# Patient Record
Sex: FEMALE | Race: WHITE | Employment: FULL TIME | URBAN - METROPOLITAN AREA
[De-identification: names, ages, dates, MRNs, and addresses within clinical notes are randomized per-mention and may not be internally consistent; named-entity substitution may affect disease eponyms.]

---

## 2024-03-08 ENCOUNTER — APPOINTMENT (EMERGENCY)
Dept: ULTRASOUND IMAGING | Facility: HOSPITAL | Age: 24
End: 2024-03-08
Payer: COMMERCIAL

## 2024-03-08 ENCOUNTER — HOSPITAL ENCOUNTER (EMERGENCY)
Facility: HOSPITAL | Age: 24
Discharge: HOME | End: 2024-03-09
Attending: EMERGENCY MEDICINE
Payer: COMMERCIAL

## 2024-03-08 VITALS
OXYGEN SATURATION: 100 % | SYSTOLIC BLOOD PRESSURE: 119 MMHG | RESPIRATION RATE: 18 BRPM | HEART RATE: 65 BPM | TEMPERATURE: 98.4 F | DIASTOLIC BLOOD PRESSURE: 63 MMHG

## 2024-03-08 DIAGNOSIS — O02.1 MISSED ABORTION: Primary | ICD-10-CM

## 2024-03-08 LAB
ABO GROUP BLD: NORMAL
ALBUMIN SERPL BCP-MCNC: 4.3 G/DL (ref 3.5–5)
ALP SERPL-CCNC: 80 U/L (ref 34–104)
ALT SERPL W P-5'-P-CCNC: 9 U/L (ref 7–52)
ANION GAP SERPL CALCULATED.3IONS-SCNC: 8 MMOL/L
AST SERPL W P-5'-P-CCNC: 13 U/L (ref 13–39)
B-HCG SERPL-ACNC: 7765 MIU/ML (ref 0–5)
BACTERIA UR QL AUTO: ABNORMAL /HPF
BASOPHILS # BLD AUTO: 0.05 THOUSANDS/ÂΜL (ref 0–0.1)
BASOPHILS NFR BLD AUTO: 1 % (ref 0–1)
BILIRUB SERPL-MCNC: 0.3 MG/DL (ref 0.2–1)
BILIRUB UR QL STRIP: NEGATIVE
BUN SERPL-MCNC: 9 MG/DL (ref 5–25)
CALCIUM SERPL-MCNC: 10 MG/DL (ref 8.4–10.2)
CHLORIDE SERPL-SCNC: 103 MMOL/L (ref 96–108)
CLARITY UR: CLEAR
CO2 SERPL-SCNC: 26 MMOL/L (ref 21–32)
COLOR UR: ABNORMAL
CREAT SERPL-MCNC: 0.52 MG/DL (ref 0.6–1.3)
EOSINOPHIL # BLD AUTO: 0.23 THOUSAND/ÂΜL (ref 0–0.61)
EOSINOPHIL NFR BLD AUTO: 2 % (ref 0–6)
ERYTHROCYTE [DISTWIDTH] IN BLOOD BY AUTOMATED COUNT: 15 % (ref 11.6–15.1)
GFR SERPL CREATININE-BSD FRML MDRD: 133 ML/MIN/1.73SQ M
GLUCOSE SERPL-MCNC: 82 MG/DL (ref 65–140)
GLUCOSE UR STRIP-MCNC: NEGATIVE MG/DL
HCT VFR BLD AUTO: 35.4 % (ref 34.8–46.1)
HGB BLD-MCNC: 11.8 G/DL (ref 11.5–15.4)
HGB UR QL STRIP.AUTO: ABNORMAL
IMM GRANULOCYTES # BLD AUTO: 0.04 THOUSAND/UL (ref 0–0.2)
IMM GRANULOCYTES NFR BLD AUTO: 0 % (ref 0–2)
KETONES UR STRIP-MCNC: NEGATIVE MG/DL
LEUKOCYTE ESTERASE UR QL STRIP: NEGATIVE
LYMPHOCYTES # BLD AUTO: 3.52 THOUSANDS/ÂΜL (ref 0.6–4.47)
LYMPHOCYTES NFR BLD AUTO: 32 % (ref 14–44)
MCH RBC QN AUTO: 28.3 PG (ref 26.8–34.3)
MCHC RBC AUTO-ENTMCNC: 33.3 G/DL (ref 31.4–37.4)
MCV RBC AUTO: 85 FL (ref 82–98)
MONOCYTES # BLD AUTO: 0.7 THOUSAND/ÂΜL (ref 0.17–1.22)
MONOCYTES NFR BLD AUTO: 6 % (ref 4–12)
MUCOUS THREADS UR QL AUTO: ABNORMAL
NEUTROPHILS # BLD AUTO: 6.45 THOUSANDS/ÂΜL (ref 1.85–7.62)
NEUTS SEG NFR BLD AUTO: 59 % (ref 43–75)
NITRITE UR QL STRIP: NEGATIVE
NON-SQ EPI CELLS URNS QL MICRO: ABNORMAL /HPF
NRBC BLD AUTO-RTO: 0 /100 WBCS
PH UR STRIP.AUTO: 5.5 [PH]
PLATELET # BLD AUTO: 299 THOUSANDS/UL (ref 149–390)
PMV BLD AUTO: 9.3 FL (ref 8.9–12.7)
POTASSIUM SERPL-SCNC: 3.5 MMOL/L (ref 3.5–5.3)
PROT SERPL-MCNC: 7.6 G/DL (ref 6.4–8.4)
PROT UR STRIP-MCNC: NEGATIVE MG/DL
RBC # BLD AUTO: 4.17 MILLION/UL (ref 3.81–5.12)
RBC #/AREA URNS AUTO: ABNORMAL /HPF
RH BLD: POSITIVE
SODIUM SERPL-SCNC: 137 MMOL/L (ref 135–147)
SP GR UR STRIP.AUTO: 1.03 (ref 1–1.03)
UROBILINOGEN UR STRIP-ACNC: <2 MG/DL
WBC # BLD AUTO: 10.99 THOUSAND/UL (ref 4.31–10.16)
WBC #/AREA URNS AUTO: ABNORMAL /HPF

## 2024-03-08 PROCEDURE — 99284 EMERGENCY DEPT VISIT MOD MDM: CPT

## 2024-03-08 PROCEDURE — 86900 BLOOD TYPING SEROLOGIC ABO: CPT

## 2024-03-08 PROCEDURE — 86901 BLOOD TYPING SEROLOGIC RH(D): CPT

## 2024-03-08 PROCEDURE — 85025 COMPLETE CBC W/AUTO DIFF WBC: CPT

## 2024-03-08 PROCEDURE — 84702 CHORIONIC GONADOTROPIN TEST: CPT

## 2024-03-08 PROCEDURE — 81001 URINALYSIS AUTO W/SCOPE: CPT

## 2024-03-08 PROCEDURE — 99285 EMERGENCY DEPT VISIT HI MDM: CPT | Performed by: EMERGENCY MEDICINE

## 2024-03-08 PROCEDURE — 36415 COLL VENOUS BLD VENIPUNCTURE: CPT

## 2024-03-08 PROCEDURE — 76801 OB US < 14 WKS SINGLE FETUS: CPT

## 2024-03-08 PROCEDURE — 80053 COMPREHEN METABOLIC PANEL: CPT

## 2024-03-08 PROCEDURE — 99244 OFF/OP CNSLTJ NEW/EST MOD 40: CPT | Performed by: OBSTETRICS & GYNECOLOGY

## 2024-03-09 PROCEDURE — 96372 THER/PROPH/DIAG INJ SC/IM: CPT

## 2024-03-09 RX ORDER — MEDROXYPROGESTERONE ACETATE 150 MG/ML
150 INJECTION, SUSPENSION INTRAMUSCULAR ONCE
Status: DISCONTINUED | OUTPATIENT
Start: 2024-03-09 | End: 2024-03-09

## 2024-03-09 RX ORDER — MEDROXYPROGESTERONE ACETATE 150 MG/ML
150 INJECTION, SUSPENSION INTRAMUSCULAR ONCE
Status: COMPLETED | OUTPATIENT
Start: 2024-03-09 | End: 2024-03-09

## 2024-03-09 RX ORDER — MISOPROSTOL 200 UG/1
800 TABLET ORAL ONCE
Status: COMPLETED | OUTPATIENT
Start: 2024-03-09 | End: 2024-03-09

## 2024-03-09 RX ADMIN — MISOPROSTOL 800 MCG: 200 TABLET ORAL at 02:10

## 2024-03-09 RX ADMIN — MEDROXYPROGESTERONE ACETATE 150 MG: 150 INJECTION, SUSPENSION INTRAMUSCULAR at 02:11

## 2024-03-09 NOTE — ED PROVIDER NOTES
History  Chief Complaint   Patient presents with    Abdominal Pain     Patient c/o strong abdominal pain x3 days in lower abdomen. +nausea. Patient was had a failed pregnancy on  but began with this severe pain 3 days ago. Denies chills/fever/SOB.      23-year-old female with no significant past medical history, , with recent ultrasound performed on 24 at [facility] which revealed embryonic demise.  She was seen at her OB office during this time and elected for expectant management.  She comes to the emergency department today for evaluation of 3 days of intermittent sharp left lower quadrant abdominal pain along with associated yellow foul-smelling vaginal discharge.  Denies any vaginal bleeding.  She also reports associated dysuria but denies any hematuria or frequency/urgency.  Denies any fevers or chills.  Reports nausea but denies any chest pain, shortness of breath, vomiting, diarrhea.  LMP .        used: Yes ([id number])    Abdominal Pain  Associated symptoms: nausea and vaginal discharge    Associated symptoms: no chest pain, no chills, no cough, no diarrhea, no dysuria, no fever, no hematuria, no shortness of breath, no sore throat, no vaginal bleeding and no vomiting        None       History reviewed. No pertinent past medical history.    History reviewed. No pertinent surgical history.    History reviewed. No pertinent family history.  I have reviewed and agree with the history as documented.    E-Cigarette/Vaping     E-Cigarette/Vaping Substances     Social History     Tobacco Use    Smoking status: Never    Smokeless tobacco: Never   Substance Use Topics    Drug use: Never        Review of Systems   Constitutional:  Negative for chills and fever.   HENT:  Negative for ear pain and sore throat.    Eyes:  Negative for pain and visual disturbance.   Respiratory:  Negative for cough and shortness of breath.    Cardiovascular:  Negative for chest pain and palpitations.    Gastrointestinal:  Positive for abdominal pain and nausea. Negative for diarrhea and vomiting.   Genitourinary:  Positive for vaginal discharge. Negative for dysuria, hematuria and vaginal bleeding.   Musculoskeletal:  Negative for arthralgias and back pain.   Skin:  Negative for color change and rash.   Neurological:  Negative for seizures and syncope.   All other systems reviewed and are negative.      Physical Exam  ED Triage Vitals   Temperature Pulse Respirations Blood Pressure SpO2   03/08/24 1929 03/08/24 1931 03/08/24 1929 03/08/24 1929 03/08/24 1929   98.4 °F (36.9 °C) 81 18 135/91 97 %      Temp Source Heart Rate Source Patient Position - Orthostatic VS BP Location FiO2 (%)   03/08/24 1929 03/08/24 1929 03/08/24 1929 03/08/24 1929 --   Oral Monitor Sitting Left arm       Pain Score       03/08/24 1929       9             Orthostatic Vital Signs  Vitals:    03/08/24 1929 03/08/24 1931 03/08/24 2049 03/08/24 2258   BP: 135/91  117/62 119/63   Pulse:  81 69 65   Patient Position - Orthostatic VS: Sitting  Lying Lying       Physical Exam  Vitals and nursing note reviewed.   Constitutional:       General: She is not in acute distress.     Appearance: She is well-developed. She is not ill-appearing or toxic-appearing.   HENT:      Head: Normocephalic and atraumatic.   Eyes:      Conjunctiva/sclera: Conjunctivae normal.   Cardiovascular:      Rate and Rhythm: Normal rate and regular rhythm.      Heart sounds: No murmur heard.  Pulmonary:      Effort: Pulmonary effort is normal. No respiratory distress.      Breath sounds: Normal breath sounds.   Abdominal:      General: There is no distension.      Palpations: Abdomen is soft.      Tenderness: There is abdominal tenderness (mild, to deep palpation) in the suprapubic area and left lower quadrant. There is no guarding or rebound.   Musculoskeletal:         General: No swelling.      Cervical back: Neck supple.   Skin:     General: Skin is warm and dry.       Capillary Refill: Capillary refill takes less than 2 seconds.   Neurological:      General: No focal deficit present.      Mental Status: She is alert.         ED Medications  Medications   miSOPROStol (Cytotec) tablet 800 mcg (800 mcg Oral Given 3/9/24 0210)   medroxyPROGESTERone (DEPO-PROVERA) IM injection 150 mg (150 mg Intramuscular Given 3/9/24 0211)       Diagnostic Studies  Results Reviewed       Procedure Component Value Units Date/Time    hCG, quantitative [[id number]]  (Abnormal) Collected: 03/08/24 2042    Lab Status: Final result Specimen: Blood from Arm, Left Updated: 03/08/24 2140     HCG, Quant 7,765 mIU/mL     Narrative:       Expected Ranges:    HCG results between 5 and 25 mIU/mL may be indicative of early pregnancy but should be interpreted in light of the total clinical presentation.    HCG can rise to detectable levels in alfredo and post menopausal women (0-11.6 mIU/mL).     Approximate               Approximate HCG  Gestation age          Concentration ( mIU/mL)  _____________          ______________________   Weeks                      HCG values  0.2-1                       5-50  1-2                           2-3                         100-5000  3-4                         500-07531  4-5                         1000-84382  5-6                         33942-877805  6-8                         34474-539492  8-12                        [id number]      Comprehensive metabolic panel [[id number]]  (Abnormal) Collected: 03/08/24 2042    Lab Status: Final result Specimen: Blood from Arm, Left Updated: 03/08/24 2107     Sodium 137 mmol/L      Potassium 3.5 mmol/L      Chloride 103 mmol/L      CO2 26 mmol/L      ANION GAP 8 mmol/L      BUN 9 mg/dL      Creatinine 0.52 mg/dL      Glucose 82 mg/dL      Calcium 10.0 mg/dL      AST 13 U/L      ALT 9 U/L      Alkaline Phosphatase 80 U/L      Total Protein 7.6 g/dL      Albumin 4.3 g/dL      Total Bilirubin 0.30 mg/dL      eGFR 133 ml/min/1.73sq m      Narrative:      National Kidney Disease Foundation guidelines for Chronic Kidney Disease (CKD):     Stage 1 with normal or high GFR (GFR > 90 mL/min/1.73 square meters)    Stage 2 Mild CKD (GFR = 60-89 mL/min/1.73 square meters)    Stage 3A Moderate CKD (GFR = 45-59 mL/min/1.73 square meters)    Stage 3B Moderate CKD (GFR = 30-44 mL/min/1.73 square meters)    Stage 4 Severe CKD (GFR = 15-29 mL/min/1.73 square meters)    Stage 5 End Stage CKD (GFR <15 mL/min/1.73 square meters)  Note: GFR calculation is accurate only with a steady state creatinine    Urine Microscopic [[id number]]  (Abnormal) Collected: 03/08/24 2048    Lab Status: Final result Specimen: Urine, Clean Catch Updated: 03/08/24 2104     RBC, UA 1-2 /hpf      WBC, UA 1-2 /hpf      Epithelial Cells Occasional /hpf      Bacteria, UA None Seen /hpf      MUCUS THREADS Occasional    UA w Reflex to Microscopic w Reflex to Culture [[id number]]  (Abnormal) Collected: 03/08/24 2048    Lab Status: Final result Specimen: Urine, Clean Catch Updated: 03/08/24 2058     Color, UA Light Yellow     Clarity, UA Clear     Specific Gravity, UA 1.026     pH, UA 5.5     Leukocytes, UA Negative     Nitrite, UA Negative     Protein, UA Negative mg/dl      Glucose, UA Negative mg/dl      Ketones, UA Negative mg/dl      Urobilinogen, UA <2.0 mg/dl      Bilirubin, UA Negative     Occult Blood, UA Trace    CBC and differential [[id number]]  (Abnormal) Collected: 03/08/24 2042    Lab Status: Final result Specimen: Blood from Arm, Left Updated: 03/08/24 2053     WBC 10.99 Thousand/uL      RBC 4.17 Million/uL      Hemoglobin 11.8 g/dL      Hematocrit 35.4 %      MCV 85 fL      MCH 28.3 pg      MCHC 33.3 g/dL      RDW 15.0 %      MPV 9.3 fL      Platelets 299 Thousands/uL      nRBC 0 /100 WBCs      Neutrophils Relative 59 %      Immat GRANS % 0 %      Lymphocytes Relative 32 %      Monocytes Relative 6 %      Eosinophils Relative 2 %      Basophils Relative 1 %      Neutrophils Absolute  6.45 Thousands/µL      Immature Grans Absolute 0.04 Thousand/uL      Lymphocytes Absolute 3.52 Thousands/µL      Monocytes Absolute 0.70 Thousand/µL      Eosinophils Absolute 0.23 Thousand/µL      Basophils Absolute 0.05 Thousands/µL                    US OB < 14 weeks with transvaginal   Final Result by Mundo Gould DO ( [id number])      Twin gestation by history with 2 gestational sacs identified. A single fetal pole is identified in the larger gestational sac (gestational sac A), crown-rump length measured at approximately 5 mm. No discrete fetal heart tones identified on the current    exam to confirm fetal viability. Follow-up recommended.      Normal appearance of the bilateral ovaries.      Other findings as above.         Workstation performed: [id number]               Procedures  Procedures      ED Course  ED Course as of 24 1703   Fri Mar 08, 2024   2325 Patient with missed  and initially elected for expectant management but now wants to consider additional medical or surgical options.  OB consulted and will be down to evaluate.    Sat Mar 09, 2024   0138 OB discussed with patient medical versus surgical options for missed .  She initially decided on Mifepristone/Misoprostol. Unfortunately unable to prescribed Mifepristone given that it is a controlled substance.  Discussed in depth with patient her options using the [place] iPad .  Discussed that she could take the Cytotec in the ED and then she will start cramping/bleeding within the next few hours versus taking the Cytotec at home after she picks it up from the pharmacy versus scheduling an appointment with OB clinic since they have misoprostol there and then she can take the second dose 24 hours after.  Discussed with patient that the 2 medications given together is slightly more effective however the misoprostol is still an effective option alone.  Patient at this time has elected to take the misoprostol  while in the ED and she agrees to follow-up in the clinic within 1 to 2 weeks.  Stable for discharge.  Reviewed return precautions.                             SBIRT 20yo+      Flowsheet Row Most Recent Value   Initial Alcohol Screen: US AUDIT-C     1. How often do you have a drink containing alcohol? 0 Filed at: 2024   2. How many drinks containing alcohol do you have on a typical day you are drinking?  0 Filed at: 2024   3a. Male UNDER 65: How often do you have five or more drinks on one occasion? 0 Filed at: 2024   3b. FEMALE Any Age, or MALE 65+: How often do you have 4 or more drinks on one occassion? 0 Filed at: 2024   Audit-C Score 0 Filed at: 2024   FERNANDO: How many times in the past year have you...    Used an illegal drug or used a prescription medication for non-medical reasons? Never Filed at: 2024                  Medical Decision Making  23-year-old female with no significant past medical history, , coming in with yellow vaginal discharge and left lower quadrant abdominal pain for the past 3 days.  She had a recent ultrasound performed on 24 at [facility] which revealed embryonic demise and elected for expectant management at that time.  No vaginal bleeding.  Afebrile.  Patient is hemodynamically stable, well-appearing, do not suspect septic  at this time.  Speculum exam performed with OB at bedside, which revealed a scant amount of physiological discharge with closed cervix.  No cervical motion tenderness.  OB and myself discussed options with patient and she opted for Cytotec in ED and has agreed to follow-up in their clinic.  Stable for discharge.  Reviewed return precautions.     Amount and/or Complexity of Data Reviewed  Labs: ordered.  Radiology: ordered.          Disposition  Final diagnoses:   Missed      Time reflects when diagnosis was documented in both MDM as applicable and the Disposition within this note        Time User Action Codes Description Comment    3/8/2024 11:24 PM Esther Brown Add [O02.1] Missed            ED Disposition       ED Disposition   Discharge    Condition   Stable    Date/Time   Sat Mar 9, 2024 0103    Comment   Joy Rodriguez discharge to home/self care.                   Follow-up Information       Follow up With Specialties Details Why Contact Info Additional Information    [facility] Emergency Department Emergency Medicine  As needed, If symptoms worsen [address] [place] [id number]  [phone] [facility] Emergency Department, [facility] [place], [place], [address]    [facility] Follow up in 1 week(s) Please make a follow-up appointment in 1-2 weeks. [address]  [address]  [facility] [id number] [phone] [facility], [address], [facility] [address], [place], [place], [address] [phone]            There are no discharge medications for this patient.    No discharge procedures on file.    PDMP Review       None             ED Provider  Attending physically available and evaluated Joy Rodriguez. I managed the patient along with the ED Attending.    Electronically Signed by           Esther Brown MD  24 [phone]

## 2024-03-09 NOTE — CONSULTS
Consultation - Gynecology  Joy Rodriguez 23 y.o. female MRN: [id number]  Unit/Bed#: ED-18 Encounter: [id number]      Inpatient consult to Obstetrics / Gynecology  Consult performed by: Jade Yao MD  Consult ordered by: Emigdio Valdovinos MD        Missed   Assessment & Plan  Today we discussed findings consistent with missed  of approximately 6 weeks gestation   We discussed treatment options including :  1. Expectant management : Aware of risks and benefits of this including but not limited to timing, risk of bleeding and need for other interventions.  Reviewed that expectant management is successful in achieving complete expulsion of pregnancy in approximately 80% of women within 8 weeks.  2. Medical management : We discussed the use of mifepristone and misoprostol. Risks of this medication as well as proper use were reviewed. Aware of potential failure and need for repeat dosage   3. Surgical Management : We discussed D&E risks and benefits including but not limited to bleeding, infection, perforation, retained products needing further surgery, or blood transfusion       The patient desires to proceed with medication management. Mifepristone unavailable in hospital and cannot be sent to outpatient pharmacy. Offered appointment ASAP in clinic for mifepristone administration vs misoprostol now (or from outpatient pharmacy). Patient desires misoprostol now.    Patient also desires outpatient Nexplanon with Depo bridge; will order Depo now as she is not receiving mifepristone.    All questions answered. D/w Dr. Stewart.        Chief Complaint   Patient presents with    Abdominal Pain     Patient c/o strong abdominal pain x3 days in lower abdomen. +nausea. Patient was had a failed pregnancy on  but began with this severe pain 3 days ago. Denies chills/fever/SOB.        No new Assessment & Plan notes have been filed under this hospital service since the last note was  generated.  Service: OB/GYN        History of Present Illness   Physician Requesting Consult: Emigdio Valdovinos*  Reason for Consult / Principal Problem: Left lower quadrant pain with yellow vaginal discharge in the setting of missed AB  Subspeciality:  General OB/GYN  HPI: Joy Rodriguez is a 23 y.o. female who presents with 3 days of sharp intermittent left lower quadrant pain and vaginal discharge in the setting of missed AB.    Patient has been receiving early pregnancy care at [facility]. She received an ultrasound on 2/19/2024 that revealed a dichorionic diamniotic twin pregnancy.  Twin A with CRL 0.8 cm (6 weeks 4 days) without a heartbeat; twin B mean gestational sac 1.4 cm.  Patient elected for expectant management at that time.    Patient reports that, in the last 3 days, she has had intermittent left lower quadrant pain.  This has been sharp.  It is made worse and better when she lies in certain positions.  She has taken Tylenol intermittently for this pain; it has helped slightly.  She has not been taking ibuprofen with the thought that the ultrasound could have been incorrect and her pregnancy could still be viable.  Reviewed with the patient that, based on an unchanged ultrasound today, her pregnancy is not a healthy, viable pregnancy.    Regarding her vaginal discharge, patient reports that she has white vaginal discharge that is slightly musky in odor.  Upon review, patient reports that this is normal for her in pregnancy.  She is not concerned about it.      Review of Systems   Constitutional:  Negative for chills and fever.   HENT:  Negative for sore throat.    Respiratory:  Negative for cough and shortness of breath.    Cardiovascular:  Negative for chest pain and palpitations.   Gastrointestinal:  Positive for constipation. Negative for abdominal pain, blood in stool, diarrhea, nausea and vomiting.   Genitourinary:  Negative for dysuria and hematuria.   Skin:  Negative for rash.   Neurological:   Positive for headaches (Occasional headaches, slightly relieved by Tylenol). Negative for dizziness.       Historical Information   History reviewed. No pertinent past medical history.  History reviewed. No pertinent surgical history.  OB History   No obstetric history on file.     History reviewed. No pertinent family history.  Social History   Social History     Substance and Sexual Activity   Alcohol Use None     Social History     Substance and Sexual Activity   Drug Use Never     Social History     Tobacco Use   Smoking Status Never   Smokeless Tobacco Never       Meds/Allergies   No current facility-administered medications for this encounter.         Allergies   Allergen Reactions    Amoxicillin Hives       Objective   Vitals: Blood pressure 119/63, pulse 65, temperature 98.4 °F (36.9 °C), temperature source Oral, resp. rate 18, last menstrual period 12/23/2023, SpO2 100%. There is no height or weight on file to calculate BMI.    No intake or output data in the 24 hours ending 03/08/24 2342    Invasive Devices       Peripheral Intravenous Line  Duration             Peripheral IV 03/08/24 Left Antecubital <1 day                    Physical Exam  Constitutional:       Appearance: Normal appearance.   Eyes:      General: No scleral icterus.        Right eye: No discharge.         Left eye: No discharge.   Pulmonary:      Effort: Pulmonary effort is normal. No respiratory distress.   Abdominal:      General: There is no distension.      Palpations: Abdomen is soft. There is no mass.      Tenderness: There is no abdominal tenderness. There is no guarding.   Genitourinary:     Labia:         Right: No rash, tenderness or lesion.         Left: No rash, tenderness or lesion.       Vagina: No erythema or lesions.      Cervix: No cervical motion tenderness, lesion or erythema. Discharge: Scant white physiologic discharge.     Uterus: Not tender.       Adnexa:         Right: No mass or tenderness.          Left: No mass  or tenderness.     Musculoskeletal:         General: No swelling or tenderness.   Skin:     Findings: No rash.   Neurological:      Mental Status: She is alert.         Lab Results:   Recent Results (from the past 24 hour(s))   CBC and differential    Collection Time: 03/08/24  8:42 PM   Result Value Ref Range    WBC 10.99 (H) 4.31 - 10.16 Thousand/uL    RBC 4.17 3.81 - 5.12 Million/uL    Hemoglobin 11.8 11.5 - 15.4 g/dL    Hematocrit 35.4 34.8 - 46.1 %    MCV 85 82 - 98 fL    MCH 28.3 26.8 - 34.3 pg    MCHC 33.3 31.4 - 37.4 g/dL    RDW 15.0 11.6 - 15.1 %    MPV 9.3 8.9 - 12.7 fL    Platelets 299 149 - 390 Thousands/uL    nRBC 0 /100 WBCs    Neutrophils Relative 59 43 - 75 %    Immat GRANS % 0 0 - 2 %    Lymphocytes Relative 32 14 - 44 %    Monocytes Relative 6 4 - 12 %    Eosinophils Relative 2 0 - 6 %    Basophils Relative 1 0 - 1 %    Neutrophils Absolute 6.45 1.85 - 7.62 Thousands/µL    Immature Grans Absolute 0.04 0.00 - 0.20 Thousand/uL    Lymphocytes Absolute 3.52 0.60 - 4.47 Thousands/µL    Monocytes Absolute 0.70 0.17 - 1.22 Thousand/µL    Eosinophils Absolute 0.23 0.00 - 0.61 Thousand/µL    Basophils Absolute 0.05 0.00 - 0.10 Thousands/µL   Comprehensive metabolic panel    Collection Time: 03/08/24  8:42 PM   Result Value Ref Range    Sodium 137 135 - 147 mmol/L    Potassium 3.5 3.5 - 5.3 mmol/L    Chloride 103 96 - 108 mmol/L    CO2 26 21 - 32 mmol/L    ANION GAP 8 mmol/L    BUN 9 5 - 25 mg/dL    Creatinine 0.52 (L) 0.60 - 1.30 mg/dL    Glucose 82 65 - 140 mg/dL    Calcium 10.0 8.4 - 10.2 mg/dL    AST 13 13 - 39 U/L    ALT 9 7 - 52 U/L    Alkaline Phosphatase 80 34 - 104 U/L    Total Protein 7.6 6.4 - 8.4 g/dL    Albumin 4.3 3.5 - 5.0 g/dL    Total Bilirubin 0.30 0.20 - 1.00 mg/dL    eGFR 133 ml/min/1.73sq m   hCG, quantitative    Collection Time: 03/08/24  8:42 PM   Result Value Ref Range    HCG, Quant 7,765 (H) 0 - 5 mIU/mL   ABO/Rh    Collection Time: 03/08/24  8:42 PM   Result Value Ref Range    ABO  Grouping O     Rh Factor Positive    UA w Reflex to Microscopic w Reflex to Culture    Collection Time: 03/08/24  8:48 PM    Specimen: Urine, Clean Catch   Result Value Ref Range    Color, UA Light Yellow     Clarity, UA Clear     Specific Gravity, UA 1.026 1.003 - 1.030    pH, UA 5.5 4.5, 5.0, 5.5, 6.0, 6.5, 7.0, 7.5, 8.0    Leukocytes, UA Negative Negative    Nitrite, UA Negative Negative    Protein, UA Negative Negative mg/dl    Glucose, UA Negative Negative mg/dl    Ketones, UA Negative Negative mg/dl    Urobilinogen, UA <2.0 <2.0 mg/dl mg/dl    Bilirubin, UA Negative Negative    Occult Blood, UA Trace (A) Negative   Urine Microscopic    Collection Time: 03/08/24  8:48 PM   Result Value Ref Range    RBC, UA 1-2 None Seen, 1-2 /hpf    WBC, UA 1-2 None Seen, 1-2 /hpf    Epithelial Cells Occasional None Seen, Occasional /hpf    Bacteria, UA None Seen None Seen, Occasional /hpf    MUCUS THREADS Occasional (A) None Seen       Imaging:   TVUS 3/8/24  IMPRESSION:  Twin gestation by history with 2 gestational sacs identified. A single fetal pole is identified in the larger gestational sac (gestational sac A), crown-rump length measured at approximately 5 mm. No discrete fetal heart tones identified on the current   exam to confirm fetal viability. Follow-up recommended.     Normal appearance of the bilateral ovaries.    MEAN GESTATIONAL SAC SIZE: 28 mm       Jade Yao MD  3/8/2024  11:42 PM

## 2024-03-09 NOTE — DISCHARGE INSTRUCTIONS
-Schedule an appointment with [facility] Women's clinic at [address] within 1-2 weeks

## 2024-03-09 NOTE — ASSESSMENT & PLAN NOTE
Today we discussed findings consistent with missed  of approximately 6 weeks gestation   We discussed treatment options including :  1. Expectant management : Aware of risks and benefits of this including but not limited to timing, risk of bleeding and need for other interventions.  Reviewed that expectant management is successful in achieving complete expulsion of pregnancy in approximately 80% of women within 8 weeks.  2. Medical management : We discussed the use of mifepristone and misoprostol. Risks of this medication as well as proper use were reviewed. Aware of potential failure and need for repeat dosage   3. Surgical Management : We discussed D&E risks and benefits including but not limited to bleeding, infection, perforation, retained products needing further surgery, or blood transfusion       The patient desires to proceed with medication management. Mifepristone unavailable in hospital and cannot be sent to outpatient pharmacy. Offered appointment ASAP in clinic for mifepristone administration vs misoprostol now (or from outpatient pharmacy). Patient desires misoprostol now.    Patient also desires outpatient Nexplanon with Depo bridge; will order Depo now as she is not receiving mifepristone.    All questions answered. D/w Dr. Stewart.

## 2024-03-11 ENCOUNTER — TELEPHONE (OUTPATIENT)
Dept: OBGYN CLINIC | Facility: CLINIC | Age: 24
End: 2024-03-11

## 2024-03-11 NOTE — TELEPHONE ENCOUNTER
Lvm for patient to help schedule follow up appointment after being seen in hospital for a miscarriage . Given office call back number to contact when available.

## 2024-03-25 ENCOUNTER — OFFICE VISIT (OUTPATIENT)
Dept: OBGYN CLINIC | Facility: CLINIC | Age: 24
End: 2024-03-25

## 2024-03-25 VITALS
DIASTOLIC BLOOD PRESSURE: 84 MMHG | BODY MASS INDEX: 40.68 KG/M2 | HEIGHT: 63 IN | SYSTOLIC BLOOD PRESSURE: 119 MMHG | WEIGHT: 229.6 LBS | RESPIRATION RATE: 18 BRPM | HEART RATE: 93 BPM

## 2024-03-25 DIAGNOSIS — N93.9 EPISODE OF HEAVY VAGINAL BLEEDING: ICD-10-CM

## 2024-03-25 DIAGNOSIS — O02.1 MISSED ABORTION: Primary | ICD-10-CM

## 2024-03-25 PROCEDURE — 99213 OFFICE O/P EST LOW 20 MIN: CPT | Performed by: OBSTETRICS & GYNECOLOGY

## 2024-03-25 RX ORDER — ACETAMINOPHEN 325 MG/1
650 TABLET ORAL EVERY 6 HOURS PRN
COMMUNITY

## 2024-03-25 RX ORDER — FERROUS SULFATE 324(65)MG
324 TABLET, DELAYED RELEASE (ENTERIC COATED) ORAL
Qty: 60 TABLET | Refills: 0 | Status: SHIPPED | OUTPATIENT
Start: 2024-03-25

## 2024-03-25 NOTE — PROGRESS NOTES
"Missed Ab Follow-Up    SUBJECTIVE:  HPI: Joy Rodriguez is a 23 y.o.  female who presents to follow-up after miscarriage managed 3/8/24.    On 3/8, the patient had presented to the ED with LLQ pain and yellow vaginal discharge. She had been receiving care at [facility], where she was diagnosed with a missed . She had elected for expectant management initially, however chose medical management with misoprostol. She proceeded to have heavy bleeding for approximately 10 hours after discharge.  Since that time, her bleeding has gotten lighter, however it persisted until only a few days ago. She does endorse some lightheadedness/dizziness/fatigue since taking misoprostol, however no syncope.    She received depo while in the ED.    History reviewed. No pertinent past medical history.    Past Surgical History:   Procedure Laterality Date    HERNIA REPAIR      age 5       Social History     Tobacco Use    Smoking status: Never    Smokeless tobacco: Never   Substance Use Topics    Alcohol use: Never    Drug use: Never         Current Outpatient Medications:     acetaminophen (TYLENOL) 325 mg tablet, Take 650 mg by mouth every 6 (six) hours as needed for mild pain, Disp: , Rfl:       OBJECTIVE:  Vitals:    24 1516   BP: 119/84   BP Location: Left arm   Patient Position: Sitting   Cuff Size: Large   Pulse: 93   Resp: 18   Weight: 104 kg (229 lb 9.6 oz)   Height: 5' 2.5\" (1.588 m)       GEN: The patient was alert, pleasant well-appearing female in no acute distress.  HEENT: No subconjunctival pallor  CV: Regular rate  PULM: nonlabored respirations  MSK: Normal gait  Skin: warm, dry. Capillary refill <2 sec  Neuro: no focal deficits  Psych: normal affect and judgement, cooperative  : Normal appearing external genitalia, vaginal mucosa    ASSESSMENT/PLAN:  Problem List          Obstetrics/Gynecology    Missed      Other Visit Diagnoses       Episode of heavy vaginal bleeding                Joy Rodriguez is a 23 " y.o.  female who presents for follow-up of missed .    She is recovering well overall, however will collect CBC to ensure no anemia.  PO iron sent to pharmacy at this time; instructed patient to take qAM with OJ.    Patient still desires Nexplanon for contraception; forms filled out. Will call patient when available for placement.      Jade Yao MD   PGY-4, OBGYN  24 3:21 PM

## 2024-04-12 ENCOUNTER — TELEPHONE (OUTPATIENT)
Dept: OBGYN CLINIC | Facility: CLINIC | Age: 24
End: 2024-04-12

## 2024-04-12 NOTE — TELEPHONE ENCOUNTER
Lvm for patient to advise we have not received insurance card for processing of Nexplanon application. Given office call back number to contact.

## 2024-05-11 ENCOUNTER — HOSPITAL ENCOUNTER (EMERGENCY)
Facility: HOSPITAL | Age: 24
Discharge: HOME | End: 2024-05-11
Attending: EMERGENCY MEDICINE
Payer: COMMERCIAL

## 2024-05-11 ENCOUNTER — APPOINTMENT (EMERGENCY)
Dept: RADIOLOGY | Facility: HOSPITAL | Age: 24
End: 2024-05-11
Payer: COMMERCIAL

## 2024-05-11 VITALS
TEMPERATURE: 98.7 F | DIASTOLIC BLOOD PRESSURE: 64 MMHG | HEART RATE: 57 BPM | RESPIRATION RATE: 16 BRPM | OXYGEN SATURATION: 100 % | SYSTOLIC BLOOD PRESSURE: 119 MMHG

## 2024-05-11 DIAGNOSIS — R55 SYNCOPE: Primary | ICD-10-CM

## 2024-05-11 DIAGNOSIS — D64.9 ANEMIA: ICD-10-CM

## 2024-05-11 DIAGNOSIS — S80.819A LEG ABRASION: ICD-10-CM

## 2024-05-11 LAB
ABO GROUP BLD: NORMAL
ALBUMIN SERPL BCP-MCNC: 4.2 G/DL (ref 3.5–5)
ALP SERPL-CCNC: 99 U/L (ref 34–104)
ALT SERPL W P-5'-P-CCNC: 10 U/L (ref 7–52)
ANION GAP SERPL CALCULATED.3IONS-SCNC: 7 MMOL/L (ref 4–13)
AST SERPL W P-5'-P-CCNC: 14 U/L (ref 13–39)
BASOPHILS # BLD AUTO: 0.06 THOUSANDS/ÂΜL (ref 0–0.1)
BASOPHILS NFR BLD AUTO: 1 % (ref 0–1)
BILIRUB SERPL-MCNC: 0.31 MG/DL (ref 0.2–1)
BLD GP AB SCN SERPL QL: NEGATIVE
BUN SERPL-MCNC: 12 MG/DL (ref 5–25)
CALCIUM SERPL-MCNC: 8.8 MG/DL (ref 8.4–10.2)
CARDIAC TROPONIN I PNL SERPL HS: <2 NG/L
CHLORIDE SERPL-SCNC: 110 MMOL/L (ref 96–108)
CO2 SERPL-SCNC: 22 MMOL/L (ref 21–32)
CREAT SERPL-MCNC: 0.47 MG/DL (ref 0.6–1.3)
EOSINOPHIL # BLD AUTO: 0.25 THOUSAND/ÂΜL (ref 0–0.61)
EOSINOPHIL NFR BLD AUTO: 3 % (ref 0–6)
ERYTHROCYTE [DISTWIDTH] IN BLOOD BY AUTOMATED COUNT: 18.5 % (ref 11.6–15.1)
GFR SERPL CREATININE-BSD FRML MDRD: 137 ML/MIN/1.73SQ M
GLUCOSE SERPL-MCNC: 85 MG/DL (ref 65–140)
HCT VFR BLD AUTO: 29.9 % (ref 34.8–46.1)
HGB BLD-MCNC: 8.6 G/DL (ref 11.5–15.4)
IMM GRANULOCYTES # BLD AUTO: 0.03 THOUSAND/UL (ref 0–0.2)
IMM GRANULOCYTES NFR BLD AUTO: 0 % (ref 0–2)
LYMPHOCYTES # BLD AUTO: 2.55 THOUSANDS/ÂΜL (ref 0.6–4.47)
LYMPHOCYTES NFR BLD AUTO: 29 % (ref 14–44)
MCH RBC QN AUTO: 21.8 PG (ref 26.8–34.3)
MCHC RBC AUTO-ENTMCNC: 28.8 G/DL (ref 31.4–37.4)
MCV RBC AUTO: 76 FL (ref 82–98)
MONOCYTES # BLD AUTO: 0.54 THOUSAND/ÂΜL (ref 0.17–1.22)
MONOCYTES NFR BLD AUTO: 6 % (ref 4–12)
NEUTROPHILS # BLD AUTO: 5.43 THOUSANDS/ÂΜL (ref 1.85–7.62)
NEUTS SEG NFR BLD AUTO: 61 % (ref 43–75)
NRBC BLD AUTO-RTO: 0 /100 WBCS
PLATELET # BLD AUTO: 371 THOUSANDS/UL (ref 149–390)
PMV BLD AUTO: 9.8 FL (ref 8.9–12.7)
POTASSIUM SERPL-SCNC: 4.1 MMOL/L (ref 3.5–5.3)
PROT SERPL-MCNC: 7.4 G/DL (ref 6.4–8.4)
RBC # BLD AUTO: 3.95 MILLION/UL (ref 3.81–5.12)
RH BLD: POSITIVE
SODIUM SERPL-SCNC: 139 MMOL/L (ref 135–147)
SPECIMEN EXPIRATION DATE: NORMAL
WBC # BLD AUTO: 8.86 THOUSAND/UL (ref 4.31–10.16)

## 2024-05-11 PROCEDURE — 86901 BLOOD TYPING SEROLOGIC RH(D): CPT | Performed by: EMERGENCY MEDICINE

## 2024-05-11 PROCEDURE — 36415 COLL VENOUS BLD VENIPUNCTURE: CPT | Performed by: EMERGENCY MEDICINE

## 2024-05-11 PROCEDURE — 84484 ASSAY OF TROPONIN QUANT: CPT | Performed by: EMERGENCY MEDICINE

## 2024-05-11 PROCEDURE — 99285 EMERGENCY DEPT VISIT HI MDM: CPT | Performed by: EMERGENCY MEDICINE

## 2024-05-11 PROCEDURE — 73030 X-RAY EXAM OF SHOULDER: CPT

## 2024-05-11 PROCEDURE — 93005 ELECTROCARDIOGRAM TRACING: CPT

## 2024-05-11 PROCEDURE — 73590 X-RAY EXAM OF LOWER LEG: CPT

## 2024-05-11 PROCEDURE — 86850 RBC ANTIBODY SCREEN: CPT | Performed by: EMERGENCY MEDICINE

## 2024-05-11 PROCEDURE — 96374 THER/PROPH/DIAG INJ IV PUSH: CPT

## 2024-05-11 PROCEDURE — 80053 COMPREHEN METABOLIC PANEL: CPT | Performed by: EMERGENCY MEDICINE

## 2024-05-11 PROCEDURE — 85025 COMPLETE CBC W/AUTO DIFF WBC: CPT | Performed by: EMERGENCY MEDICINE

## 2024-05-11 PROCEDURE — 99284 EMERGENCY DEPT VISIT MOD MDM: CPT

## 2024-05-11 PROCEDURE — 86900 BLOOD TYPING SEROLOGIC ABO: CPT | Performed by: EMERGENCY MEDICINE

## 2024-05-11 RX ORDER — KETOROLAC TROMETHAMINE 30 MG/ML
15 INJECTION, SOLUTION INTRAMUSCULAR; INTRAVENOUS ONCE
Status: COMPLETED | OUTPATIENT
Start: 2024-05-11 | End: 2024-05-11

## 2024-05-11 RX ORDER — GINSENG 100 MG
1 CAPSULE ORAL ONCE
Status: COMPLETED | OUTPATIENT
Start: 2024-05-11 | End: 2024-05-11

## 2024-05-11 RX ADMIN — KETOROLAC TROMETHAMINE 15 MG: 30 INJECTION, SOLUTION INTRAMUSCULAR; INTRAVENOUS at 14:16

## 2024-05-11 RX ADMIN — BACITRACIN 1 SMALL APPLICATION: 500 OINTMENT TOPICAL at 14:16

## 2024-05-11 NOTE — ED PROVIDER NOTES
"History  Chief Complaint   Patient presents with    Fall     Pt arrives via EMS after near syncope with fall. Right shoulder pain. LLE pain. Ambulatory at home. -HS -thinners. Pt on her cycle now after having a spontaneous  last month and describes her bleeding as very heavy.      Joy is a 23 y.o. female who presents with the chief complaint of a syncopal episode.  She reports she was at home when she began to feel lightheaded.  The next thing she knew her child was saying \"mommy ... Mommy\".  She has pain in her right shoulder and has a superficial linear abrasion to her left shin. No similar symptoms in the past. No headache.  She reports she had a miscarriage 2 months ago, had no period last month and is currently on her period and that the bleeding is significant.       History provided by:  Patient   used: Yes (Tia ADAME)        Prior to Admission Medications   Prescriptions Last Dose Informant Patient Reported? Taking?   acetaminophen (TYLENOL) 325 mg tablet   Yes No   Sig: Take 650 mg by mouth every 6 (six) hours as needed for mild pain   ferrous sulfate 324 (65 Fe) mg   No No   Sig: Take 1 tablet (324 mg total) by mouth daily before breakfast      Facility-Administered Medications: None       No past medical history on file.    Past Surgical History:   Procedure Laterality Date    HERNIA REPAIR      age 5       Family History   Problem Relation Age of Onset    Heart attack Neg Hx     Stroke Neg Hx     Cancer Neg Hx      I have reviewed and agree with the history as documented.    E-Cigarette/Vaping    E-Cigarette Use Never User      E-Cigarette/Vaping Substances     Social History     Tobacco Use    Smoking status: Never    Smokeless tobacco: Never   Vaping Use    Vaping status: Never Used   Substance Use Topics    Alcohol use: Never    Drug use: Never       Review of Systems   Constitutional:  Negative for chills and fever.   Genitourinary:  Positive for menstrual problem and " vaginal bleeding.   Musculoskeletal:  Positive for arthralgias (right shoulder and left shin).   Skin:  Positive for wound (linear abrasion to left shin).   Neurological:  Positive for syncope.       Physical Exam  Physical Exam  Vitals and nursing note reviewed.   Constitutional:       General: She is in acute distress (mild).      Appearance: She is well-developed.   HENT:      Head: Normocephalic and atraumatic.   Eyes:      Pupils: Pupils are equal, round, and reactive to light.   Neck:      Vascular: No JVD.   Cardiovascular:      Rate and Rhythm: Normal rate and regular rhythm.      Heart sounds: Normal heart sounds. No murmur heard.     No friction rub. No gallop.   Pulmonary:      Effort: Pulmonary effort is normal. No respiratory distress.      Breath sounds: Normal breath sounds. No wheezing or rales.   Chest:      Chest wall: No tenderness.   Musculoskeletal:         General: Tenderness (right shoulder) present. No swelling or deformity. Normal range of motion.      Cervical back: Normal range of motion.   Skin:     General: Skin is warm and dry.      Comments: Long linear abrasion along anterior left shin     Neurological:      General: No focal deficit present.      Mental Status: She is alert and oriented to person, place, and time.   Psychiatric:         Behavior: Behavior normal.         Thought Content: Thought content normal.         Judgment: Judgment normal.         Vital Signs  ED Triage Vitals [05/11/24 1336]   Temperature Pulse Respirations Blood Pressure SpO2   98.7 °F (37.1 °C) 64 16 132/83 100 %      Temp Source Heart Rate Source Patient Position - Orthostatic VS BP Location FiO2 (%)   Oral Monitor Lying Left arm --      Pain Score       --           Vitals:    05/11/24 1336   BP: 132/83   Pulse: 64   Patient Position - Orthostatic VS: Lying         Visual Acuity      ED Medications  Medications   bacitracin topical ointment 1 small application (1 small application Topical Given 5/11/24  1416)   ketorolac (TORADOL) injection 15 mg (15 mg Intravenous Given 5/11/24 1416)       Diagnostic Studies  Results Reviewed       Procedure Component Value Units Date/Time    Comprehensive metabolic panel [[id number]]  (Abnormal) Collected: 05/11/24 1407    Lab Status: Final result Specimen: Blood from Arm, Right Updated: 05/11/24 1443     Sodium 139 mmol/L      Potassium 4.1 mmol/L      Chloride 110 mmol/L      CO2 22 mmol/L      ANION GAP 7 mmol/L      BUN 12 mg/dL      Creatinine 0.47 mg/dL      Glucose 85 mg/dL      Calcium 8.8 mg/dL      AST 14 U/L      ALT 10 U/L      Alkaline Phosphatase 99 U/L      Total Protein 7.4 g/dL      Albumin 4.2 g/dL      Total Bilirubin 0.31 mg/dL      eGFR 137 ml/min/1.73sq m     Narrative:      National Kidney Disease Foundation guidelines for Chronic Kidney Disease (CKD):     Stage 1 with normal or high GFR (GFR > 90 mL/min/1.73 square meters)    Stage 2 Mild CKD (GFR = 60-89 mL/min/1.73 square meters)    Stage 3A Moderate CKD (GFR = 45-59 mL/min/1.73 square meters)    Stage 3B Moderate CKD (GFR = 30-44 mL/min/1.73 square meters)    Stage 4 Severe CKD (GFR = 15-29 mL/min/1.73 square meters)    Stage 5 End Stage CKD (GFR <15 mL/min/1.73 square meters)  Note: GFR calculation is accurate only with a steady state creatinine    HS Troponin 0hr (reflex protocol) [[id number]]  (Normal) Collected: 05/11/24 1407    Lab Status: Final result Specimen: Blood from Arm, Right Updated: 05/11/24 1434     hs TnI 0hr <2 ng/L     HS Troponin I 2hr [[id number]]     Lab Status: No result Specimen: Blood     CBC and differential [[id number]]  (Abnormal) Collected: 05/11/24 1407    Lab Status: Final result Specimen: Blood from Arm, Right Updated: 05/11/24 1413     WBC 8.86 Thousand/uL      RBC 3.95 Million/uL      Hemoglobin 8.6 g/dL      Hematocrit 29.9 %      MCV 76 fL      MCH 21.8 pg      MCHC 28.8 g/dL      RDW 18.5 %      MPV 9.8 fL      Platelets 371 Thousands/uL      nRBC 0 /100 WBCs       Segmented % 61 %      Immature Grans % 0 %      Lymphocytes % 29 %      Monocytes % 6 %      Eosinophils Relative 3 %      Basophils Relative 1 %      Absolute Neutrophils 5.43 Thousands/µL      Absolute Immature Grans 0.03 Thousand/uL      Absolute Lymphocytes 2.55 Thousands/µL      Absolute Monocytes 0.54 Thousand/µL      Eosinophils Absolute 0.25 Thousand/µL      Basophils Absolute 0.06 Thousands/µL                    XR shoulder 2+ views RIGHT   ED Interpretation by Perico Ledbetter MD (05/11 1506)   This film was interpreted independently by me.  No fracture or dislocation.          XR tibia fibula 2 views LEFT   ED Interpretation by Perico Ledbetter MD (05/11 1506)   This film was interpreted independently by me.  No fracture or dislocation.                       Procedures  ECG 12 Lead Documentation Only    Date/Time: 5/11/2024 2:14 PM    Performed by: Perico Ledbetter MD  Authorized by: Perico Ledbetter MD    Indications / Diagnosis:  Syncope  ECG reviewed by me, the ED Provider: yes    Patient location:  ED  Previous ECG:     Previous ECG:  Unavailable  Interpretation:     Interpretation: normal    Rate:     ECG rate:  70    ECG rate assessment: normal    Rhythm:     Rhythm: sinus rhythm    Ectopy:     Ectopy: none    QRS:     QRS axis:  Normal    QRS intervals:  Normal  Conduction:     Conduction: normal    ST segments:     ST segments:  Normal  T waves:     T waves: normal             ED Course                       PERC Rule for PE      Flowsheet Row Most Recent Value   PERC Rule for PE    Age >=50 0 Filed at: 05/11/2024 1415   HR >=100 0 Filed at: 05/11/2024 1415   O2 Sat on room air < 95% 0 Filed at: 05/11/2024 1415   History of PE or DVT 0 Filed at: 05/11/2024 1415   Recent trauma or surgery 0 Filed at: 05/11/2024 1415   Hemoptysis 0 Filed at: 05/11/2024 1415   Exogenous estrogen 0 Filed at: 05/11/2024 1415   Unilateral leg swelling 0 Filed at: 05/11/2024 1415   PERC Rule for PE  Results 0 Filed at: 05/11/2024 1415                SBIRT 22yo+      Flowsheet Row Most Recent Value   Initial Alcohol Screen: US AUDIT-C     1. How often do you have a drink containing alcohol? 0 Filed at: 05/11/2024 1347   2. How many drinks containing alcohol do you have on a typical day you are drinking?  0 Filed at: 05/11/2024 1347   3b. FEMALE Any Age, or MALE 65+: How often do you have 4 or more drinks on one occassion? 0 Filed at: 05/11/2024 1347   Audit-C Score 0 Filed at: 05/11/2024 1347   FERNANDO: How many times in the past year have you...    Used an illegal drug or used a prescription medication for non-medical reasons? Never Filed at: 05/11/2024 1347            Wells' Criteria for PE      Flowsheet Row Most Recent Value   Wells' Criteria for PE    Clinical signs and symptoms of DVT 0 Filed at: 05/11/2024 1415   PE is primary diagnosis or equally likely 0 Filed at: 05/11/2024 1415   HR >100 0 Filed at: 05/11/2024 1415   Immobilization at least 3 days or Surgery in the previous 4 weeks 0 Filed at: 05/11/2024 1415   Previous, objectively diagnosed PE or DVT 0 Filed at: 05/11/2024 1415   Hemoptysis 0 Filed at: 05/11/2024 1415   Malignancy with treatment within 6 months or palliative 0 Filed at: 05/11/2024 1415   Wells' Criteria Total 0 Filed at: 05/11/2024 1415                  Medical Decision Making  Background: 23 y.o. female presents with syncope, pain in shoulder, left shin    Differential DX includes but is not limited to: anemia induced syncope, vasovagal syncope, arrhythmia, doubt acs, doubt PE    Plan: cbc, cmp, EKG, troponin, imaging, pain control      Amount and/or Complexity of Data Reviewed  Labs: ordered.  Radiology: ordered and independent interpretation performed.    Risk  OTC drugs.  Prescription drug management.             Disposition  Final diagnoses:   Syncope   Anemia   Leg abrasion     Time reflects when diagnosis was documented in both MDM as applicable and the Disposition within this  note       Time User Action Codes Description Comment    5/11/2024  3:23 PM Perico Ledbetter [R55] Syncope     5/11/2024  3:23 PM Perico Ledbetter Add [D64.9] Anemia     5/11/2024  3:24 PM Perico Ledbetter [S80.819A] Leg abrasion           ED Disposition       ED Disposition   Discharge    Condition   Stable    Date/Time   Sat May 11, 2024 1523    Comment   Joy Rodriguez discharge to home/self care.                   Follow-up Information       Follow up With Specialties Details Why Contact Info    Noreen Ramirez MD Internal Medicine Schedule an appointment as soon as possible for a visit in 1 week  [address] [facility]  [address]  [place] [id number] [phone]              Patient's Medications   Discharge Prescriptions    No medications on file       No discharge procedures on file.    PDMP Review       None            ED Provider  Electronically Signed by             Perico Ledbetter MD  05/11/24 1525

## 2024-05-11 NOTE — ED NOTES
Pt ambulated to br with steady gait, offers no complaints at this time. C/o dizziness after walking to bathroom and laying down. VSS reassessed and WDL     Laura Sommers RN  05/11/24 2441       Laura Sommers RN  05/11/24 9246

## 2024-05-12 LAB
ATRIAL RATE: 70 BPM
P AXIS: 36 DEGREES
PR INTERVAL: 166 MS
QRS AXIS: 30 DEGREES
QRSD INTERVAL: 84 MS
QT INTERVAL: 384 MS
QTC INTERVAL: 414 MS
T WAVE AXIS: 10 DEGREES
VENTRICULAR RATE: 70 BPM

## 2024-05-12 PROCEDURE — 93010 ELECTROCARDIOGRAM REPORT: CPT | Performed by: INTERNAL MEDICINE

## 2024-05-18 ENCOUNTER — HOSPITAL ENCOUNTER (EMERGENCY)
Facility: HOSPITAL | Age: 24
Discharge: HOME | End: 2024-05-18
Attending: EMERGENCY MEDICINE
Payer: COMMERCIAL

## 2024-05-18 VITALS
HEART RATE: 78 BPM | DIASTOLIC BLOOD PRESSURE: 71 MMHG | OXYGEN SATURATION: 100 % | RESPIRATION RATE: 18 BRPM | SYSTOLIC BLOOD PRESSURE: 123 MMHG | TEMPERATURE: 98 F

## 2024-05-18 DIAGNOSIS — N93.9 EPISODE OF HEAVY VAGINAL BLEEDING: ICD-10-CM

## 2024-05-18 DIAGNOSIS — N93.9 VAGINAL BLEEDING: Primary | ICD-10-CM

## 2024-05-18 LAB
ALBUMIN SERPL BCP-MCNC: 4.4 G/DL (ref 3.5–5)
ALP SERPL-CCNC: 91 U/L (ref 34–104)
ALT SERPL W P-5'-P-CCNC: 10 U/L (ref 7–52)
ANION GAP SERPL CALCULATED.3IONS-SCNC: 8 MMOL/L (ref 4–13)
APTT PPP: 33 SECONDS (ref 23–37)
AST SERPL W P-5'-P-CCNC: 15 U/L (ref 13–39)
BASOPHILS # BLD AUTO: 0.06 THOUSANDS/ÂΜL (ref 0–0.1)
BASOPHILS NFR BLD AUTO: 1 % (ref 0–1)
BILIRUB SERPL-MCNC: 0.3 MG/DL (ref 0.2–1)
BUN SERPL-MCNC: 14 MG/DL (ref 5–25)
CALCIUM SERPL-MCNC: 9.1 MG/DL (ref 8.4–10.2)
CHLORIDE SERPL-SCNC: 108 MMOL/L (ref 96–108)
CO2 SERPL-SCNC: 22 MMOL/L (ref 21–32)
CREAT SERPL-MCNC: 0.66 MG/DL (ref 0.6–1.3)
EOSINOPHIL # BLD AUTO: 0.35 THOUSAND/ÂΜL (ref 0–0.61)
EOSINOPHIL NFR BLD AUTO: 4 % (ref 0–6)
ERYTHROCYTE [DISTWIDTH] IN BLOOD BY AUTOMATED COUNT: 19.5 % (ref 11.6–15.1)
EXT PREGNANCY TEST URINE: NEGATIVE
EXT. CONTROL: NORMAL
GFR SERPL CREATININE-BSD FRML MDRD: 123 ML/MIN/1.73SQ M
GLUCOSE SERPL-MCNC: 87 MG/DL (ref 65–140)
HCT VFR BLD AUTO: 29.3 % (ref 34.8–46.1)
HGB BLD-MCNC: 8.6 G/DL (ref 11.5–15.4)
IMM GRANULOCYTES # BLD AUTO: 0.03 THOUSAND/UL (ref 0–0.2)
IMM GRANULOCYTES NFR BLD AUTO: 0 % (ref 0–2)
INR PPP: 1 (ref 0.84–1.19)
LYMPHOCYTES # BLD AUTO: 3.16 THOUSANDS/ÂΜL (ref 0.6–4.47)
LYMPHOCYTES NFR BLD AUTO: 36 % (ref 14–44)
MCH RBC QN AUTO: 21.9 PG (ref 26.8–34.3)
MCHC RBC AUTO-ENTMCNC: 29.4 G/DL (ref 31.4–37.4)
MCV RBC AUTO: 75 FL (ref 82–98)
MONOCYTES # BLD AUTO: 0.48 THOUSAND/ÂΜL (ref 0.17–1.22)
MONOCYTES NFR BLD AUTO: 6 % (ref 4–12)
NEUTROPHILS # BLD AUTO: 4.68 THOUSANDS/ÂΜL (ref 1.85–7.62)
NEUTS SEG NFR BLD AUTO: 53 % (ref 43–75)
NRBC BLD AUTO-RTO: 0 /100 WBCS
PLATELET # BLD AUTO: 427 THOUSANDS/UL (ref 149–390)
PMV BLD AUTO: 10.1 FL (ref 8.9–12.7)
POTASSIUM SERPL-SCNC: 3.7 MMOL/L (ref 3.5–5.3)
PROT SERPL-MCNC: 7.9 G/DL (ref 6.4–8.4)
PROTHROMBIN TIME: 13.8 SECONDS (ref 11.6–14.5)
RBC # BLD AUTO: 3.93 MILLION/UL (ref 3.81–5.12)
SODIUM SERPL-SCNC: 138 MMOL/L (ref 135–147)
WBC # BLD AUTO: 8.76 THOUSAND/UL (ref 4.31–10.16)

## 2024-05-18 PROCEDURE — 81025 URINE PREGNANCY TEST: CPT

## 2024-05-18 PROCEDURE — 85730 THROMBOPLASTIN TIME PARTIAL: CPT

## 2024-05-18 PROCEDURE — 36415 COLL VENOUS BLD VENIPUNCTURE: CPT

## 2024-05-18 PROCEDURE — 96361 HYDRATE IV INFUSION ADD-ON: CPT

## 2024-05-18 PROCEDURE — 83550 IRON BINDING TEST: CPT

## 2024-05-18 PROCEDURE — 83540 ASSAY OF IRON: CPT

## 2024-05-18 PROCEDURE — 80053 COMPREHEN METABOLIC PANEL: CPT

## 2024-05-18 PROCEDURE — 85025 COMPLETE CBC W/AUTO DIFF WBC: CPT

## 2024-05-18 PROCEDURE — 99283 EMERGENCY DEPT VISIT LOW MDM: CPT

## 2024-05-18 PROCEDURE — 96360 HYDRATION IV INFUSION INIT: CPT

## 2024-05-18 PROCEDURE — 85610 PROTHROMBIN TIME: CPT

## 2024-05-18 PROCEDURE — 99284 EMERGENCY DEPT VISIT MOD MDM: CPT | Performed by: EMERGENCY MEDICINE

## 2024-05-18 PROCEDURE — 82728 ASSAY OF FERRITIN: CPT

## 2024-05-18 RX ORDER — FERROUS SULFATE 324(65)MG
324 TABLET, DELAYED RELEASE (ENTERIC COATED) ORAL
Qty: 30 TABLET | Refills: 0 | Status: SHIPPED | OUTPATIENT
Start: 2024-05-18 | End: 2024-06-17

## 2024-05-18 RX ADMIN — SODIUM CHLORIDE 1000 ML: 0.9 INJECTION, SOLUTION INTRAVENOUS at 17:01

## 2024-05-18 NOTE — ED ATTENDING ATTESTATION
"5/18/2024  I, Anabell Jade MD, saw and evaluated the patient. I have discussed the patient with the resident/non-physician practitioner and agree with the resident's/non-physician practitioner's findings, Plan of Care, and MDM as documented in the resident's/non-physician practitioner's note, except where noted. All available labs and Radiology studies were reviewed.  I was present for key portions of any procedure(s) performed by the resident/non-physician practitioner and I was immediately available to provide assistance.       At this point I agree with the current assessment done in the Emergency Department.  I have conducted an independent evaluation of this patient a history and physical is as follows:    Patient is a 23-year-old female presents to the emergency department with ongoing vaginal bleeding and over the last several days lightheadedness, shortness of breath as well as lower abdominal discomfort/cramping.    In early March she was identified to have missed AB of a twin pregnancy.  She received misoprostol, followed up in the OB office and received Depo-Provera.  She had 10 days of heavy bleeding, lightening and then cessation of flow until May 6 at which time bleeding began again heavy.  She relates that throughout this time she has been needing to change her pad approximately every 2 hours.  On 4 occasions she has passed sizable clot which she describes as \"hard.\"  She has increased abdominal cramping around that time.  She shows a photo of 1 of these passages (it is dark and heterogeneous/nodular in appearance).  No problems with bowel movements or urination.    She was seen in the ED on 5/11 following episode of syncope.  Hemoglobin at that time was found to be 8.6.    On exam today she appears slightly tired.  Mucous membranes are moist.  She has mild pallor.  Heart sounds regular.  Lungs clear to auscultation bilaterally.  Tenderness present throughout the lower abdomen " without guarding.  No CVA tenderness.    Differential diagnosis includes but is not limited to incomplete AB/retained products, dysfunctional uterine bleeding, worsening of anemia, pelvic infection, new pregnancy +/- ectopic.    ED Course         Critical Care Time  Procedures

## 2024-05-18 NOTE — ED PROVIDER NOTES
History  Chief Complaint   Patient presents with    Vaginal Bleeding     Pt reports 2 months ago pt had , had strong period onset 5/6 changing pads every 2 hours, passed tissue today     vb        Prior to Admission Medications   Prescriptions Last Dose Informant Patient Reported? Taking?   acetaminophen (TYLENOL) 325 mg tablet   Yes No   Sig: Take 650 mg by mouth every 6 (six) hours as needed for mild pain   ferrous sulfate 324 (65 Fe) mg   No No   Sig: Take 1 tablet (324 mg total) by mouth daily before breakfast   ferrous sulfate 324 (65 Fe) mg   No Yes   Sig: Take 1 tablet (324 mg total) by mouth daily before breakfast      Facility-Administered Medications: None       No past medical history on file.    Past Surgical History:   Procedure Laterality Date    HERNIA REPAIR      age 5       Family History   Problem Relation Age of Onset    Heart attack Neg Hx     Stroke Neg Hx     Cancer Neg Hx      I have reviewed and agree with the history as documented.    E-Cigarette/Vaping    E-Cigarette Use Never User      E-Cigarette/Vaping Substances     Social History     Tobacco Use    Smoking status: Never    Smokeless tobacco: Never   Vaping Use    Vaping status: Never Used   Substance Use Topics    Alcohol use: Never    Drug use: Never        Review of Systems    Physical Exam  ED Triage Vitals [24 1559]   Temperature Pulse Respirations Blood Pressure SpO2   98 °F (36.7 °C) 78 18 123/71 100 %      Temp Source Heart Rate Source Patient Position - Orthostatic VS BP Location FiO2 (%)   Oral Monitor Sitting Right arm --      Pain Score       --             Orthostatic Vital Signs  Vitals:    24 1559   BP: 123/71   Pulse: 78   Patient Position - Orthostatic VS: Sitting       Physical Exam    ED Medications  Medications   sodium chloride 0.9 % bolus 1,000 mL (1,000 mL Intravenous New Bag 24 1701)       Diagnostic Studies  Results Reviewed       Procedure Component Value Units Date/Time     Comprehensive metabolic panel [[id number]] Collected: 05/18/24 1700    Lab Status: Final result Specimen: Blood from Arm, Right Updated: 05/18/24 1740     Sodium 138 mmol/L      Potassium 3.7 mmol/L      Chloride 108 mmol/L      CO2 22 mmol/L      ANION GAP 8 mmol/L      BUN 14 mg/dL      Creatinine 0.66 mg/dL      Glucose 87 mg/dL      Calcium 9.1 mg/dL      AST 15 U/L      ALT 10 U/L      Alkaline Phosphatase 91 U/L      Total Protein 7.9 g/dL      Albumin 4.4 g/dL      Total Bilirubin 0.30 mg/dL      eGFR 123 ml/min/1.73sq m     Narrative:      [employer] guidelines for Chronic Kidney Disease (CKD):     Stage 1 with normal or high GFR (GFR > 90 mL/min/1.73 square meters)    Stage 2 Mild CKD (GFR = 60-89 mL/min/1.73 square meters)    Stage 3A Moderate CKD (GFR = 45-59 mL/min/1.73 square meters)    Stage 3B Moderate CKD (GFR = 30-44 mL/min/1.73 square meters)    Stage 4 Severe CKD (GFR = 15-29 mL/min/1.73 square meters)    Stage 5 End Stage CKD (GFR <15 mL/min/1.73 square meters)  Note: GFR calculation is accurate only with a steady state creatinine    Protime-INR [[id number]]  (Normal) Collected: 05/18/24 1700    Lab Status: Final result Specimen: Blood from Arm, Right Updated: 05/18/24 1731     Protime 13.8 seconds      INR 1.00    APTT [[id number]]  (Normal) Collected: 05/18/24 1700    Lab Status: Final result Specimen: Blood from Arm, Right Updated: 05/18/24 1731     PTT 33 seconds     CBC and differential [[id number]]  (Abnormal) Collected: 05/18/24 1700    Lab Status: Final result Specimen: Blood from Arm, Right Updated: 05/18/24 1709     WBC 8.76 Thousand/uL      RBC 3.93 Million/uL      Hemoglobin 8.6 g/dL      Hematocrit 29.3 %      MCV 75 fL      MCH 21.9 pg      MCHC 29.4 g/dL      RDW 19.5 %      MPV 10.1 fL      Platelets 427 Thousands/uL      nRBC 0 /100 WBCs      Segmented % 53 %      Immature Grans % 0 %      Lymphocytes % 36 %      Monocytes % 6 %      Eosinophils Relative 4 %       Basophils Relative 1 %      Absolute Neutrophils 4.68 Thousands/µL      Absolute Immature Grans 0.03 Thousand/uL      Absolute Lymphocytes 3.16 Thousands/µL      Absolute Monocytes 0.48 Thousand/µL      Eosinophils Absolute 0.35 Thousand/µL      Basophils Absolute 0.06 Thousands/µL     POCT pregnancy, urine [[id number]]  (Normal) Resulted: 05/18/24 1655    Lab Status: Final result Updated: 05/18/24 1655     EXT Preg Test, Ur Negative     Control Valid                   No orders to display         Procedures  Procedures      ED Course  ED Course as of 05/18/24 1919   Sat May 18, 2024   1656 POCT pregnancy, urine   1712 CBC and differential(!)                                       Medical Decision Making  Amount and/or Complexity of Data Reviewed  Labs: ordered. Decision-making details documented in ED Course.    Risk  OTC drugs.          Disposition  Final diagnoses:   Vaginal bleeding     Time reflects when diagnosis was documented in both MDM as applicable and the Disposition within this note       Time User Action Codes Description Comment    5/18/2024  7:13 PM Sabrina Parikh Add [N93.9] Vaginal bleeding     5/18/2024  7:15 PM Sabrina Parikh Add [N93.9] Episode of heavy vaginal bleeding           ED Disposition       ED Disposition   Discharge    Condition   Stable    Date/Time   Sat May 18, 2024  7:13 PM    Comment   Joy Rodriguez discharge to home/self care.                   Follow-up Information       Follow up With Specialties Details Why Contact Info Additional Information    [facility]   [address] [place] [id number] [phone] [facility], [address] [place] [address], [id number] [phone]            Patient's Medications   Discharge Prescriptions    No medications on file         PDMP Review       None             ED Provider  Attending physically available and evaluated Joy Rodriguez. I  managed the patient along with the ED Attending.    Electronically Signed by         Course  ED Course as of 24 1640   Sat May 18, 2024   1656 POCT pregnancy, urine   1712 CBC and differential(!)                                       Medical Decision Making  23y F p/w vaginal bleeding    Ddx includes DDx including but not limited to: ectopic pregnancy, threatened , missed , incomplete , anemia, coagulopathy, DUB, tumor, retained products of conception, PCOS; doubt ovarian torsion or ruptured ovarian cyst.     Plan: bloodwork    Overall patient stable. PE largely unremarkable, blood in posterior fornix. Will send off anemia panel, start pt on Fe. Recommend f/u, patient d/c home in stable condition.      Amount and/or Complexity of Data Reviewed  Labs: ordered. Decision-making details documented in ED Course.    Risk  OTC drugs.          Disposition  Final diagnoses:   Vaginal bleeding     Time reflects when diagnosis was documented in both MDM as applicable and the Disposition within this note       Time User Action Codes Description Comment    2024  7:13 PM Sabrina Parikh Add [N93.9] Vaginal bleeding     2024  7:15 PM Sabrina Parikh Add [N93.9] Episode of heavy vaginal bleeding           ED Disposition       ED Disposition   Discharge    Condition   Stable    Date/Time   Sat May 18, 2024  7:13 PM    Comment   Joy Rodriguez discharge to home/self care.                   Follow-up Information       Follow up With Specialties Details Why Contact Info Additional Information    [facility]   [address] [facility] [address] [phone] [facility], [address], [place], [id number] [phone]            Discharge Medication List as of 2024  7:18 PM        CONTINUE these medications which have CHANGED    Details   ferrous sulfate 324 (65 Fe) mg Take 1 tablet (324 mg total) by mouth daily before breakfast, Starting Sat 2024, Until Mon  6/17/2024, Print           CONTINUE these medications which have NOT CHANGED    Details   acetaminophen (TYLENOL) 325 mg tablet Take 650 mg by mouth every 6 (six) hours as needed for mild pain, Historical Med               PDMP Review       None             ED Provider  Attending physically available and evaluated Joy Rodriguez. I managed the patient along with the ED Attending.    Electronically Signed by           Sabrina Parikh DO  05/27/24 1640

## 2024-05-19 LAB
FERRITIN SERPL-MCNC: 3 NG/ML (ref 11–307)
IRON SATN MFR SERPL: 3 % (ref 15–50)
IRON SERPL-MCNC: 21 UG/DL (ref 50–212)
TIBC SERPL-MCNC: 723 UG/DL (ref 250–450)
UIBC SERPL-MCNC: 702 UG/DL (ref 155–355)